# Patient Record
Sex: MALE | Race: OTHER | HISPANIC OR LATINO | URBAN - METROPOLITAN AREA
[De-identification: names, ages, dates, MRNs, and addresses within clinical notes are randomized per-mention and may not be internally consistent; named-entity substitution may affect disease eponyms.]

---

## 2018-10-23 ENCOUNTER — EMERGENCY (EMERGENCY)
Facility: HOSPITAL | Age: 38
LOS: 0 days | Discharge: HOME | End: 2018-10-24
Attending: EMERGENCY MEDICINE | Admitting: EMERGENCY MEDICINE

## 2018-10-23 VITALS
RESPIRATION RATE: 18 BRPM | WEIGHT: 134.04 LBS | SYSTOLIC BLOOD PRESSURE: 116 MMHG | HEART RATE: 72 BPM | HEIGHT: 66 IN | TEMPERATURE: 96 F | OXYGEN SATURATION: 98 % | DIASTOLIC BLOOD PRESSURE: 74 MMHG

## 2018-10-23 DIAGNOSIS — T52.8X1A TOXIC EFFECT OF OTHER ORGANIC SOLVENTS, ACCIDENTAL (UNINTENTIONAL), INITIAL ENCOUNTER: ICD-10-CM

## 2018-10-23 DIAGNOSIS — J45.909 UNSPECIFIED ASTHMA, UNCOMPLICATED: ICD-10-CM

## 2018-10-23 DIAGNOSIS — Z88.0 ALLERGY STATUS TO PENICILLIN: ICD-10-CM

## 2018-10-24 VITALS
DIASTOLIC BLOOD PRESSURE: 59 MMHG | OXYGEN SATURATION: 99 % | RESPIRATION RATE: 18 BRPM | TEMPERATURE: 97 F | HEART RATE: 49 BPM | SYSTOLIC BLOOD PRESSURE: 113 MMHG

## 2018-10-24 NOTE — ED PROVIDER NOTE - ATTENDING CONTRIBUTION TO CARE
37 yo male with PMH of asthma presents to the ER for ingestion of chemical agent. States at around 3:30 pm he ingested something called Prepsol which is a wax and /cleaning agent. Pt states his father had placed the chemical in an Aquafina water bottle for some reason and left it in the fridge. He thought it was water, and took a sip. Immediately he knew it was not water, and it tasted like alcohol and chemical so he spit the liquid out. Felt some burning sensation to throat and states he has drank some water since. Belching a gasoline like smell. Pt has no vomiting, no sob, no numbness or weakness, no sore throat. Observed in ER for hours, cxr and ekg ok, poison control contacted. Agree with PA management.

## 2018-10-24 NOTE — ED PROVIDER NOTE - MEDICAL DECISION MAKING DETAILS
37 yo male presents to ER after an ingestion of chemical  for wax/grease. Took a sip but no respiratory complaints and exam fine. Pt observed in ER for several hours, poison control contacted. Pt doing well and dc'd home to follow up with PMD

## 2018-10-24 NOTE — ED PROVIDER NOTE - PROGRESS NOTE DETAILS
Spoke with toxicology Dr. Stearns, recommending most likely symptoms from pt's ingestion would be respiratory, since has been over 12 hrs since ingestion and no respiratory symptoms, can d/c. Agreeable with EKG.

## 2018-10-24 NOTE — ED PROVIDER NOTE - OBJECTIVE STATEMENT
Pt is a 39 y/o Male, PMHX of Asthma presents to ED w/ ingestion of substance. He states he ingested Prepsol, a wax and grease removal substance at ~3:30pm. He ingested it because his dad placed the substance in an Aqu[employer] water bottle, and when he came home he thought it was water, so he took a sip. He states immediately he noticed it burned and tasted like alcohol, so he spit the rest out. Pt states he has tolerated PO since, but over the course of the hours developed abdominal pain and nausea, which has since improved since coming to the ER. He denies any vomiting. Denies headache, dizziness, lightheadedness, chest pain, SOB, numbness, weakness, denies sore throat.

## 2018-10-24 NOTE — ED PROVIDER NOTE - PHYSICAL EXAMINATION
VITAL SIGNS: I have reviewed the initial vital signs.   CONSTITUTIONAL: Awake, alert. Well-developed; well-nourished; in no distress. Non-toxic appearing.   SKIN: No rash, vesicles/lesion, abrasions or lacerations. No ecchymosis or signs of trauma.   HEAD: Normocephalic; atraumatic.  ENT: Airway patent. MMM. Oropharynx clear with no erythema, exudates or tonsillar enlargement. Uvula midline.   CARD: No chest wall deformity or tenderness. S1, S2 normal; no murmurs, gallops, or rubs. Regular rate and rhythm.  RESP: Good air movement. Lungs CTAB. No crackles, wheezes, rales or rhonchi.  ABD: Soft; non-distended; non-tender.

## 2018-10-24 NOTE — ED PROVIDER NOTE - NS ED ROS FT
Except as documented in HPI, all other ROS negative.   GENERAL: Denies fever/chills, loss of appetite/weight or fatigue.  SKIN: Denies rashes, abrasions, lacerations, ecchymosis, erythema, or edema.  HEAD: Denies headache, dizziness or trauma.  ENT: + ingestion of foreign substance.   CARDIAC: Denies chest pain, palpitations, or SOB.   RESPIRATORY: Denies SOB, cough, hemoptysis or wheezing.   GI: Denies abdominal pain, n/v/d.   MSK: Denies myalgias, bony deformity or pain.   NEURO: Denies paresthesias, tingling, weakness.

## 2022-02-18 ENCOUNTER — EMERGENCY (EMERGENCY)
Facility: HOSPITAL | Age: 42
LOS: 0 days | Discharge: HOME | End: 2022-02-18
Attending: EMERGENCY MEDICINE | Admitting: EMERGENCY MEDICINE
Payer: MEDICAID

## 2022-02-18 VITALS
WEIGHT: 139.99 LBS | RESPIRATION RATE: 18 BRPM | DIASTOLIC BLOOD PRESSURE: 69 MMHG | SYSTOLIC BLOOD PRESSURE: 124 MMHG | HEART RATE: 67 BPM | TEMPERATURE: 98 F | HEIGHT: 66 IN | OXYGEN SATURATION: 99 %

## 2022-02-18 DIAGNOSIS — K08.89 OTHER SPECIFIED DISORDERS OF TEETH AND SUPPORTING STRUCTURES: ICD-10-CM

## 2022-02-18 DIAGNOSIS — Z88.0 ALLERGY STATUS TO PENICILLIN: ICD-10-CM

## 2022-02-18 PROBLEM — J45.909 UNSPECIFIED ASTHMA, UNCOMPLICATED: Chronic | Status: ACTIVE | Noted: 2018-10-24

## 2022-02-18 PROCEDURE — 99284 EMERGENCY DEPT VISIT MOD MDM: CPT

## 2022-02-18 NOTE — ED PROVIDER NOTE - NS ED ROS FT
Constitutional: No fever   Eyes:  No visual changes  Ears:  No hearing changes  Neck: No neck pain  Oral: tooth pain  Cardiac:  No chest pain  Respiratory:  No SOB   GI:  No abdominal pain, nausea, or vomiting  :  No dysuria  MS:  No back pain  Neuro:  No headache or weakness.  No LOC  Skin:  No skin rash

## 2022-02-18 NOTE — CONSULT NOTE ADULT - SUBJECTIVE AND OBJECTIVE BOX
Patient is a 41y old  Male who presents with a chief complaint of Pain on upper right #4 and #5    HPI: Patient reports pain started 4 days ago. Patient tried to make an appointment with his primary dentist but was unsuccessful. Reports pain on percussion and palpation on #4 and #5.      PAST MEDICAL & SURGICAL HISTORY:  Asthma      ( -  ) heart valve replacement  ( -  ) joint replacement  ( -  ) pregnancy    MEDICATIONS  (STANDING):    MEDICATIONS  (PRN):      Allergies    penicillin (Unknown)    Intolerances    *Last Dental Visit: Unknown    Vital Signs Last 24 Hrs  T(C): 36.4 (18 Feb 2022 15:18), Max: 36.4 (18 Feb 2022 15:18)  T(F): 97.6 (18 Feb 2022 15:18), Max: 97.6 (18 Feb 2022 15:18)  HR: 67 (18 Feb 2022 15:18) (67 - 67)  BP: 124/69 (18 Feb 2022 15:18) (124/69 - 124/69)  BP(mean): --  RR: 18 (18 Feb 2022 15:18) (18 - 18)  SpO2: 99% (18 Feb 2022 15:18) (99% - 99%)        EOE:  TMJ ( -  ) clicks                     ( -  ) pops                     ( -  ) crepitus             Mandible <<FROM>>             Facial bones and MOM <<grossly intact>>             ( -  ) trismus             ( -  ) lymphadenopathy             ( -  ) swelling             ( -  ) asymmetry             ( -  ) palpation             ( -  ) dyspnea             ( -  ) dysphagia             ( -  ) loss of consciousness    IOE:  <<permanent>> dentition: <<multiple carious teeth>> and <<multiple missing teeth>>           hard/soft palate:  ( -  ) palatal torus, <<No pathology noted>>           tongue/FOM <<No pathology noted>>           labial/buccal mucosa <<No pathology noted>>           ( +  ) percussion           ( +  ) palpation           ( -  ) swelling            ( -  ) abscess           ( -  ) sinus tract      *DENTAL RADIOGRAPHS: 1 periapical      *ASSESSMENT: gross decay on #4 and #5 with periapical pathology both teeth are non restorable.      *PLAN: Extraction of #4 and #5    PROCEDURE:   Verbal and written consent given. Treatment consequences explained to the patient as per OS sheet dated 7/13/00, Consent obtained, site side signed.  Anesthesia: << 4 carpules of 4% Septocaine with 1:100,000 epinephrine administered via local infiltration.   >>   Treatment: <<#4 and #5 elevated and extracted, sockets curetted and irrigated with saline. Hemostasis achieved. Facial composite on #6 came off during the extraction. Informed the patient. placed cavit in #6 and advised the patient to follow up with primary dentist for permanent restoration on #6.    >>   Prescribed clindamycin 300mg q8h for 7 days and ibuprofen prn.    RECOMMENDATIONS:  1) Dental F/U with outpatient dentist for comprehensive dental care.   2) If any difficulty swallowing/breathing, fever occur, return to ER.     Resident Name, pager # Justo Ortiz DDS, #[phone]

## 2022-02-18 NOTE — ED PROVIDER NOTE - OBJECTIVE STATEMENT
41M who present with right upper tooth pain for past 2 days. today have some overlying swelling on cheek. no fever chills nausea vomiting difficulty breathing.

## 2022-02-18 NOTE — ED ADULT NURSE NOTE - OBJECTIVE STATEMENT
41 yr old male c/o right sided dental pain. denies chest pain/sob, n/v/d. Patient states he broke his tooth 2 days ago pain now worse. Patient states he has allergy to penicillin . Denies fever.

## 2022-02-18 NOTE — ED PROVIDER NOTE - PHYSICAL EXAMINATION
CONSTITUTIONAL: well-developed, well-nourished, in no acute distress  SKIN: warm, dry  HEAD: Normocephalic  EYES: no conjunctival erythema  ENT: no nasal discharge, airway clear right upper teeth ttp overlying swelling right cheek  NECK: full ROM, non-tender no swelling trimus or drooling  RESP: normal respiratory effort  NEURO: alert and oriented, grossly unremarkable  PSYCH: cooperative, appropriate

## 2024-10-18 ENCOUNTER — EMERGENCY (EMERGENCY)
Facility: HOSPITAL | Age: 44
LOS: 0 days | Discharge: HOME | End: 2024-10-18
Attending: STUDENT IN AN ORGANIZED HEALTH CARE EDUCATION/TRAINING PROGRAM
Payer: MEDICAID

## 2024-10-18 VITALS
SYSTOLIC BLOOD PRESSURE: 132 MMHG | DIASTOLIC BLOOD PRESSURE: 83 MMHG | OXYGEN SATURATION: 100 % | TEMPERATURE: 98 F | WEIGHT: 134.92 LBS | HEART RATE: 80 BPM | HEIGHT: 66 IN | RESPIRATION RATE: 16 BRPM
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
## 2024-10-18 DIAGNOSIS — M54.50 LOW BACK PAIN, UNSPECIFIED: ICD-10-CM
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
## 2024-10-18 DIAGNOSIS — Z88.0 ALLERGY STATUS TO PENICILLIN: ICD-10-CM

## 2024-10-18 PROCEDURE — 99283 EMERGENCY DEPT VISIT LOW MDM: CPT | Mod: 25

## 2024-10-18 PROCEDURE — 99284 EMERGENCY DEPT VISIT MOD MDM: CPT

## 2024-10-18 PROCEDURE — 96372 THER/PROPH/DIAG INJ SC/IM: CPT

## 2024-10-18 RX ORDER — ACETAMINOPHEN 325 MG
2 TABLET ORAL
Qty: 56 | Refills: 0
Start: 2024-10-18 | End: 2024-10-24

## 2024-10-18 RX ORDER — DIAZEPAM 10 MG/1
5 TABLET ORAL ONCE
Refills: 0 | Status: DISCONTINUED | OUTPATIENT
Start: 2024-10-18 | End: 2024-10-18

## 2024-10-18 RX ORDER — KETOROLAC TROMETHAMINE 10 MG/1
30 TABLET, FILM COATED ORAL ONCE
Refills: 0 | Status: DISCONTINUED | OUTPATIENT
Start: 2024-10-18 | End: 2024-10-18

## 2024-10-18 RX ADMIN — DIAZEPAM 5 MILLIGRAM(S): 10 TABLET ORAL at 17:15

## 2024-10-18 RX ADMIN — KETOROLAC TROMETHAMINE 30 MILLIGRAM(S): 10 TABLET, FILM COATED ORAL at 17:18

## 2024-10-18 NOTE — ED PROVIDER NOTE - CLINICAL SUMMARY MEDICAL DECISION MAKING FREE TEXT BOX
45 yo M w/ no pmh p/w lower back pain since Tuesday when he was working on his car, bent forward, felt a "rip" to the lower back, and since then has had significant low back pain. no trauma. no IVDA, no fevers, no saddle anesthesia, no urinary/bowel incontinence/retention. pain worse with ROM. exam as documented. discussed with patient need for outpatient MRI. signs of cord compression discussed with strict return to ED precautions. given appropriate pain medication. ambulating in the ED.

## 2024-10-18 NOTE — ED PROVIDER NOTE - PHYSICAL EXAMINATION
CONSTITUTIONAL: Well-developed; well-nourished; in no acute distress.   SKIN: warm, dry  HEAD: Normocephalic; atraumatic.  EYES: EOMI, no conjunctival erythema  ENT: No nasal discharge; airway clear.  NECK: Supple; non tender.  ABD: soft ntnd.  BACK: no ecchymosis. no midline spinal tenderness, bilateral paraspinal muscle tenderness/spasm.   EXT: Normal ROM.  No clubbing, cyanosis or edema.  DP pulses 2+ bilaterally, strength and sensation symmetric and intact to bilateral lower extremities.  NEURO: Alert, oriented, grossly unremarkable  PSYCH: Cooperative, appropriate.

## 2024-10-18 NOTE — ED PROVIDER NOTE - NSFOLLOWUPINSTRUCTIONS_ED_ALL_ED_FT
Our Emergency Department Referral Coordinators will be reaching out to you in the next 24-48 hours from 9:00am to 5:00pm to schedule a follow up appointment. Please expect a phone call from the hospital in that time frame. If you do not receive a call or if you have any questions or concerns, you can reach them at   [phone].    Back Pain    Back pain is very common in adults. The cause of back pain is rarely dangerous and the pain often gets better over time. The cause of your back pain may not be known and may include strain of muscles or ligaments, degeneration of the spinal disks, or arthritis. Occasionally the pain may radiate down your leg(s). Over-the-counter medicines to reduce pain and inflammation are often the most helpful. Stretching and remaining active frequently helps the healing process.     SEEK IMMEDIATE MEDICAL CARE IF YOU HAVE ANY OF THE FOLLOWING SYMPTOMS: bowel or bladder control problems, unusual weakness or numbness in your arms or legs, nausea or vomiting, abdominal pain, fever, dizziness/lightheadedness.

## 2024-10-18 NOTE — ED PROVIDER NOTE - OBJECTIVE STATEMENT
45 yo M w/ no pmh p/w lower back pain since Tuesday when he was working on his car, bent forward, felt a "rip" to the lower back, and since then has had significant low back pain. no trauma. no IVDA, no fevers, no saddle anesthesia, no urinary/bowel incontinence/retention. pain worse with ROM.

## 2024-10-18 NOTE — ED PROVIDER NOTE - PATIENT PORTAL LINK FT
You can access the FollowMyHealth Patient Portal offered by [facility] by registering at the following website: [email or web address]. By joining [employer]’s FollowMyHealth portal, you will also be able to view your health information using other applications (apps) compatible with our system.
